# Patient Record
(demographics unavailable — no encounter records)

---

## 2019-08-16 NOTE — ROOR
________________________________________________________________________________

Patient Name: Arnie Olmstead              Procedure Date: 8/16/2019 10:57 AM

MRN: [id number]                          Account Number: [id number]

YOB: 1968              Age: 50

Room: [facility]                            Gender: Male

Note Status: Finalized                 

________________________________________________________________________________

 

Procedure:           Colonoscopy

Indications:         Screening for colorectal malignant neoplasm

Providers:           Wilberto Bennett MD

Referring MD:        NOAM SOLANO MD

Requesting Provider: 

Medicines:           Monitored Anesthesia Care

Complications:       No immediate complications.

________________________________________________________________________________

Procedure:           Pre-Anesthesia Assessment:

                     - Prior to the procedure, a History and Physical was 

                     performed, and patient medications and allergies were 

                     reviewed. The patient is competent. The risks and 

                     benefits of the procedure and the sedation options and 

                     risks were discussed with the patient. All questions were 

                     answered and informed consent was obtained. Patient 

                     identification and proposed procedure were verified by 

                     the physician, the nurse and the anesthesiologist in the 

                     procedure room. Mental Status Examination: alert and 

                     oriented. Airway Examination: normal oropharyngeal airway 

                     and neck mobility. Respiratory Examination: clear to 

                     auscultation. CV Examination: normal. Prophylactic 

                     Antibiotics: The patient does not require prophylactic 

                     antibiotics. Prior Anticoagulants: The patient has taken 

                     no previous anticoagulant or antiplatelet agents. ASA 

                     Grade Assessment: II - A patient with mild systemic 

                     disease. After reviewing the risks and benefits, the 

                     patient was deemed in satisfactory condition to undergo 

                     the procedure. The anesthesia plan was to use monitored 

                     anesthesia care (MAC). Immediately prior to 

                     administration of medications, the patient was 

                     re-assessed for adequacy to receive sedatives. The heart 

                     rate, respiratory rate, oxygen saturations, blood 

                     pressure, adequacy of pulmonary ventilation, and response 

                     to care were monitored throughout the procedure. The 

                     physical status of the patient was re-assessed after the 

                     procedure.

                     The Colonoscope was introduced through the anus and 

                     advanced to the terminal ileum, with identification of 

                     the appendiceal orifice and IC valve. The colonoscopy was 

                     performed without difficulty. The patient tolerated the 

                     procedure well. The quality of the bowel preparation was 

                     good. The terminal ileum, ileocecal valve, appendiceal 

                     orifice, and rectum were photographed. Scope insertion 

                     time was 3 minutes. Scope withdrawal time was 8 minutes. 

                     The total duration of the procedure was 11 minutes.

                                                                                

Findings:

     The perianal and digital rectal examinations were normal.

     The terminal ileum appeared normal.

     Multiple small and large-mouthed diverticula were found from sigmoid to 

     ascending colon. There was no evidence of diverticular bleeding.

     Non-bleeding external and internal hemorrhoids were found during 

     retroflexion. The hemorrhoids were small.

     No other significant abnormalities were identified in a careful 

     examination of the remainder of the colon.

                                                                                

Impression:          - The examined portion of the ileum was normal.

                     - Moderate diverticulosis from sigmoid to ascending 

                     colon. There was no evidence of diverticular bleeding.

                     - Non-bleeding external and internal hemorrhoids.

                     - No specimens collected.

Recommendation:      - Patient has a contact number available for emergencies. 

                     The signs and symptoms of potential delayed complications 

                     were discussed with the patient. Return to normal 

                     activities tomorrow. Written discharge instructions were 

                     provided to the patient.

                     - High fiber diet.

                     - Continue present medications.

                     - Repeat colonoscopy in 10 years for screening purposes.

                     - Telephone GI clinic for pathology results (from EGD 

                     biopsies) in 2 weeks.

                     - Return to primary care physician.

                                                                                

 

Wilberto Bennett MD

_______________________

Wilberto Bennett MD

8/16/2019 12:27:54 PM

Electronically signed by Wilberto Bennett MD

Number of Addenda: 0

 

Note Initiated On: 8/16/2019 10:57 AM

Estimated Blood Loss:

     Estimated blood loss: none.

## 2019-08-16 NOTE — ROOR
________________________________________________________________________________

Patient Name: Arnie Olmstead              Procedure Date: 8/16/2019 10:56 AM

MRN: [id number]                          Account Number: [id number]

YOB: 1968              Age: 50

Room: [facility]                            Gender: Male

Note Status: Finalized                 

________________________________________________________________________________

 

Procedure:           Upper GI endoscopy

Indications:         Suspected gastro-esophageal reflux disease

Providers:           Wilberto Bennett MD

Referring MD:        NOAM SOLANO MD

Requesting Provider: 

Medicines:           Monitored Anesthesia Care

Complications:       No immediate complications.

________________________________________________________________________________

Procedure:           Pre-Anesthesia Assessment:

                     - Prior to the procedure, a History and Physical was 

                     performed, and patient medications and allergies were 

                     reviewed. The patient is competent. The risks and 

                     benefits of the procedure and the sedation options and 

                     risks were discussed with the patient. All questions were 

                     answered and informed consent was obtained. Patient 

                     identification and proposed procedure were verified by 

                     the physician, the nurse and the anesthesiologist in the 

                     procedure room. Mental Status Examination: alert and 

                     oriented. Airway Examination: normal oropharyngeal airway 

                     and neck mobility. Respiratory Examination: clear to 

                     auscultation. CV Examination: normal. Prophylactic 

                     Antibiotics: The patient does not require prophylactic 

                     antibiotics. Prior Anticoagulants: The patient has taken 

                     no previous anticoagulant or antiplatelet agents. ASA 

                     Grade Assessment: II - A patient with mild systemic 

                     disease. After reviewing the risks and benefits, the 

                     patient was deemed in satisfactory condition to undergo 

                     the procedure. The anesthesia plan was to use monitored 

                     anesthesia care (MAC). Immediately prior to 

                     administration of medications, the patient was 

                     re-assessed for adequacy to receive sedatives. The heart 

                     rate, respiratory rate, oxygen saturations, blood 

                     pressure, adequacy of pulmonary ventilation, and response 

                     to care were monitored throughout the procedure. The 

                     physical status of the patient was re-assessed after the 

                     procedure.

                     The Endoscope was introduced through the mouth, and 

                     advanced to the second part of duodenum. The upper GI 

                     endoscopy was accomplished without difficulty. The 

                     patient tolerated the procedure well.

                                                                                

Findings:

     LA Grade B (one or more mucosal breaks greater than 5 mm, not extending 

     between the tops of two mucosal folds) esophagitis with no bleeding was 

     found 35 to 38 cm from the incisors. Biopsies were taken with a cold 

     forceps for histology. Verification of patient identification for the 

     specimen was done by the physician and nurse using the patient's name, 

     birth date and medical record number. Estimated blood loss was minimal.

     Scattered mild inflammation characterized by erythema and granularity was 

     found in the gastric antrum. Biopsies were taken with a cold forceps for 

     Helicobacter pylori testing.

     The duodenal bulb and second portion of the duodenum were normal. 

     Biopsies for histology were taken with a cold forceps for evaluation of 

     celiac disease.

                                                                                

Impression:          - LA Grade B reflux esophagitis. Rule out Manzano's 

                     esophagus. Biopsied.

                     - Gastritis. Biopsied.

                     - Normal duodenal bulb and second portion of the 

                     duodenum. Biopsied.

Recommendation:      - Patient has a contact number available for emergencies. 

                     The signs and symptoms of potential delayed complications 

                     were discussed with the patient. Return to normal 

                     activities tomorrow. Written discharge instructions were 

                     provided to the patient.

                     - Resume previous diet.

                     - Continue present medications.

                     - Use Protonix (pantoprazole) 40 mg PO daily - to be 

                     taken early morning 1/2 hour before breakfast for 3 

                     months.

                     - Follow an antireflux regimen.

                     - Await pathology results.

                     - Repeat upper endoscopy in 1 year to check healing.

                     - Telephone GI clinic for pathology results in 2 weeks.

                     - Return to primary care physician.

                                                                                

 

Wilberto Bennett MD

_______________________

Wilberto Bennett MD

8/16/2019 11:51:26 AM

Electronically signed by Wilberto Bennett MD

Number of Addenda: 0

 

Note Initiated On: 8/16/2019 10:56 AM

Estimated Blood Loss:

     Estimated blood loss was minimal.